# Patient Record
Sex: FEMALE | Race: WHITE | Employment: FULL TIME | URBAN - METROPOLITAN AREA
[De-identification: names, ages, dates, MRNs, and addresses within clinical notes are randomized per-mention and may not be internally consistent; named-entity substitution may affect disease eponyms.]

---

## 2018-06-20 ENCOUNTER — THERAPY VISIT (OUTPATIENT)
Dept: CHIROPRACTIC MEDICINE | Facility: CLINIC | Age: 43
End: 2018-06-20
Payer: COMMERCIAL

## 2018-06-20 DIAGNOSIS — M79.10 MYALGIA: ICD-10-CM

## 2018-06-20 DIAGNOSIS — M25.552 HIP PAIN, LEFT: ICD-10-CM

## 2018-06-20 DIAGNOSIS — M99.05 SOMATIC DYSFUNCTION OF PELVIS REGION: Primary | ICD-10-CM

## 2018-06-20 DIAGNOSIS — G57.02 PIRIFORMIS SYNDROME, LEFT: ICD-10-CM

## 2018-06-20 PROCEDURE — 99202 OFFICE O/P NEW SF 15 MIN: CPT | Performed by: CHIROPRACTOR

## 2018-06-26 PROBLEM — M79.10 MYALGIA: Status: ACTIVE | Noted: 2018-06-26

## 2018-06-26 PROBLEM — M99.05 SOMATIC DYSFUNCTION OF PELVIS REGION: Status: ACTIVE | Noted: 2018-06-26

## 2018-06-26 PROBLEM — M25.552 HIP PAIN, LEFT: Status: ACTIVE | Noted: 2018-06-26

## 2018-06-26 PROBLEM — G57.02 PIRIFORMIS SYNDROME, LEFT: Status: ACTIVE | Noted: 2018-06-26

## 2018-06-27 NOTE — PROGRESS NOTES
[facility]  Jun 20, 2018    Subjective:  Dina Maldonado   42 year old   female    CC: L posterior hip pain  Medications reviewed: Denies   Visit: 1/6  Goal: decrease pain 50%, sit for 30 minutes without hip pain   Location: L posterior hip, L hamstring  DELFINO: Denies any specific DELFINO, gradual pain from exercising over last month   Pain: varies but 7/10 at it worse, much worse with sitting  Previous History: Denies any significant trauma  Progression: not changing   Quality: ache, tightness, tingling sensation   Radiation: L hamstring above the knee  Pain is worse with: sitting, waking up  Pain is better with: movement   Timing: frequent pain  Under care: has not seen anyone else for this  Imaging: Denies  Social: alert, oriented, and active.  and avid exercises. Works out very hard most days of the week. Has not missed a day of working out.       Objective:  Inspection:  No SDD  No Scars  Normal Gait    Palpation:  No specific pain  Palpable soreness over L posterior hip   Myofascitis 2/4 noted over L hip ER's, L hamstring    ROM:  Lumbar flexion   90/90, tightness left posterior hip   Lumbar extension  30/30  Right Hip IR  31/45  Left Hip IR  22/45  Right Hip ER  44/60  Left  hip ER  48/60  SLR symmetric with mild discomfort over left hamstring    MMT:  Left glute med 4/5 with posterior hip discomfort   Right glute med 4/5 with no pain  Left TFL 5/5 with no pain  Right TFL 5/5 with no pain  Left piriformis 4/5 with mild discomfort   Right piriformis 5/5 with no pain    MET:  L short    Squat:  Shift to L   Foot flare R    Lunge:  L knee valgus     NAL:   Restricted SI on right side    Neuro:  Able to toe walk and heel walk  L4-S1 light touch WNL    Ortho:  SLR (-)    C5-T1 light touch WNL  Reflexes C5, C6, C7 2/4 and symmetric  MMT C5, C6, C7, C8, T1 5/5 Bilateral     Assessment:  NAL with associated myofascitis and weakness  Piriformis syndrome, possible disc buldge     Plan:   Decrease  pain 50%    Restore symmetric hip IR   Restore symmetric hip ER   Strengthen hip stabilizers to 5/5 B   Restore pelvic leveling   Restore segmental motion   Functional goals in history    Patient was given detailed history, review of symptoms, examination, functional examination, and report of findings. After this patient was treated with chiropractic adjustments, manual therapy, and therapeutic exercise. Patient tolerated treatment well. Patients treatment plan with be 2 times per week for 2 weeks followed by 1 time per week for 2 weeks. Following treatment plan a follow up exam will be done to make sure patient is improving. Treatment frequency will degrease as patients subjective complaints improve as well as objective findings. Prognosis for care is good based on fact that patient is active and is willing to take active approach in care.     Patient tolerated treatment well today  Treatment Time: 45 minutes  41349 manipulation 1-2 segments: MET, Hip LAD, (no Manual)  03790 Manual therapy: (ART, Graston, Strain Counter Strain, Fascial Manipulation, Cupping) performed over area of L hip ER's, L psoas, B glutes  07389 therapeutic exercise (30 minutes): bottom leg hip ER, sciatic floss  Strapping: hip IR on L  Taping:  Next visit: 1 week  Other:      Mayank Lane DC, MEd, ATC

## 2018-07-03 ENCOUNTER — THERAPY VISIT (OUTPATIENT)
Dept: CHIROPRACTIC MEDICINE | Facility: CLINIC | Age: 43
End: 2018-07-03
Payer: COMMERCIAL

## 2018-07-03 DIAGNOSIS — M25.552 HIP PAIN, LEFT: ICD-10-CM

## 2018-07-03 DIAGNOSIS — M79.10 MYALGIA: ICD-10-CM

## 2018-07-03 DIAGNOSIS — G57.02 PIRIFORMIS SYNDROME, LEFT: ICD-10-CM

## 2018-07-03 DIAGNOSIS — M99.05 SOMATIC DYSFUNCTION OF PELVIS REGION: ICD-10-CM

## 2018-07-03 PROCEDURE — 98940 CHIROPRACT MANJ 1-2 REGIONS: CPT | Mod: AT | Performed by: CHIROPRACTOR

## 2018-07-03 PROCEDURE — 97110 THERAPEUTIC EXERCISES: CPT | Performed by: CHIROPRACTOR

## 2018-07-04 NOTE — PROGRESS NOTES
[facility]  Jun 20, 2018    Subjective:  Dina Maldonado   42 year old   female    CC: L posterior hip pain  Medications reviewed: Denies   Visit: 1/6  Goal: decrease pain 50%, sit for 30 minutes without hip pain   Location: L posterior hip, L hamstring  DELFINO: Denies any specific DELFINO, gradual pain from exercising over last month   Pain: varies but 7/10 at it worse, much worse with sitting  Previous History: Denies any significant trauma  Progression: not changing   Quality: ache, tightness, tingling sensation   Radiation: L hamstring above the knee  Pain is worse with: sitting, waking up  Pain is better with: movement   Timing: frequent pain  Under care: has not seen anyone else for this  Imaging: Denies  Social: alert, oriented, and active.  and avid exercises. Works out very hard most days of the week. Has not missed a day of working out.   Comes in today doing very well. Notes after exam is feeling better. Able to sit more. She is working on active care. Denies any new issues or radiating pain.       Objective:  Inspection:  No SDD  No Scars  Normal Gait    Palpation:  No specific pain  Palpable soreness over L posterior hip   Myofascitis 2/4 noted over L hip ER's, L hamstring    ROM:  Lumbar flexion   90/90, tightness left posterior hip   Lumbar extension  30/30  Right Hip IR  31/45  Left Hip IR  22/45  Right Hip ER  44/60  Left  hip ER  48/60  SLR symmetric with mild discomfort over left hamstring    MMT:  Left glute med 4/5 with posterior hip discomfort   Right glute med 4/5 with no pain  Left TFL 5/5 with no pain  Right TFL 5/5 with no pain  Left piriformis 4/5 with mild discomfort   Right piriformis 5/5 with no pain    MET:  L short    Squat:  Shift to L   Foot flare R    Lunge:  L knee valgus     NAL:   Restricted SI on right side    Ortho:  SLR (-)    Assessment:  NAL with associated myofascitis and weakness  Piriformis syndrome, possible disc buldge     Plan:  Patient tolerated  treatment well today  Treatment Time: 45 minutes  52177 manipulation 1-2 segments: MET, Hip LAD, (no Manual)  56786 Manual therapy: (ART, Graston, Strain Counter Strain, Fascial Manipulation, Cupping) performed over area of L hip ER's, L psoas, B glutes  10665 therapeutic exercise (30 minutes): bottom leg hip ER, sciatic floss  Dry Needle: 4 with IFC over glute med area (tolerated well)  Strapping: hip IR on L  Taping:  Theragun: 4 minutes over left posterior hip   Next visit: 1 week  Other:      Mayank Lane DC, MEd, ATC

## 2018-07-20 ENCOUNTER — THERAPY VISIT (OUTPATIENT)
Dept: CHIROPRACTIC MEDICINE | Facility: CLINIC | Age: 43
End: 2018-07-20
Payer: COMMERCIAL

## 2018-07-20 DIAGNOSIS — G57.02 PIRIFORMIS SYNDROME, LEFT: ICD-10-CM

## 2018-07-20 DIAGNOSIS — M25.552 HIP PAIN, LEFT: ICD-10-CM

## 2018-07-20 DIAGNOSIS — M99.05 SOMATIC DYSFUNCTION OF PELVIS REGION: ICD-10-CM

## 2018-07-20 DIAGNOSIS — M79.10 MYALGIA: ICD-10-CM

## 2018-07-20 PROCEDURE — 97110 THERAPEUTIC EXERCISES: CPT | Performed by: CHIROPRACTOR

## 2018-07-20 PROCEDURE — 98940 CHIROPRACT MANJ 1-2 REGIONS: CPT | Mod: AT | Performed by: CHIROPRACTOR

## 2018-07-20 NOTE — PROGRESS NOTES
[facility]  Jun 20, 2018    Subjective:  Dina Maldonado   42 year old   female    CC: L posterior hip pain  Medications reviewed: Denies   Visit: 2/6  Goal: decrease pain 50%, sit for 30 minutes without hip pain   Location: L posterior hip, L hamstring  DELFINO: Denies any specific DELFINO, gradual pain from exercising over last month   Pain: varies but 7/10 at it worse, much worse with sitting  Previous History: Denies any significant trauma  Progression: not changing   Quality: ache, tightness, tingling sensation   Radiation: L hamstring above the knee  Pain is worse with: sitting, waking up  Pain is better with: movement   Timing: frequent pain  Under care: has not seen anyone else for this  Imaging: Denies  Social: alert, oriented, and active.  and avid exercises. Works out very hard most days of the week. Has not missed a day of working out.   Comes in today after vacation and notes she is sore in B hips with L most sore. Notes that felt good after last session, but sitting for long periods on plane caused it to be more sore. She notes she is getting sore with lifting and we spent time discussing that she needs to work the smaller muscles instead and she agreed. She denies any new issues.   She is pleased with treatment approach.       Objective:  Inspection:  No SDD  No Scars  Normal Gait    Palpation:  No specific pain  Palpable soreness over L posterior hip   Myofascitis 2/4 noted over L hip ER's, L hamstring    ROM:  Lumbar flexion   90/90, tightness left posterior hip   Lumbar extension  30/30  Right Hip IR  31/45  Left Hip IR  22/45  Right Hip ER  44/60  Left  hip ER  48/60  SLR symmetric with mild discomfort over left hamstring    MMT:  Left glute med 4/5 with posterior hip discomfort   Right glute med 4/5 with no pain  Left TFL 5/5 with no pain  Right TFL 5/5 with no pain  Left piriformis 4/5 with mild discomfort   Right piriformis 5/5 with no pain    MET:  L short    Squat:  Shift  to L   Foot flare R    Lunge:  L knee valgus     NAL:   Restricted SI on right side    Ortho:  SLR (-)    Assessment:  NAL with associated myofascitis and weakness  Piriformis syndrome, possible disc buldge     Plan:  Patient tolerated treatment well today  Treatment Time: 45 minutes  90916 manipulation 1-2 segments: MET, Hip LAD, (no Manual)  30446 Manual therapy: (ART, Graston, Strain Counter Strain, Fascial Manipulation, Cupping) performed over area of L hip ER's, L psoas, B glutes  97072 therapeutic exercise (30 minutes): bottom leg hip ER, sciatic floss, clams, side lying leg raises   Dry Needle: 4 posterior hips   Strapping: hip IR on L  Taping:  Theragun: 4 minutes over left posterior hip   Next visit: 1 week  Other:      Mayank Lane DC, MEd, ATC

## 2018-07-27 ENCOUNTER — THERAPY VISIT (OUTPATIENT)
Dept: CHIROPRACTIC MEDICINE | Facility: CLINIC | Age: 43
End: 2018-07-27
Payer: COMMERCIAL

## 2018-07-27 DIAGNOSIS — M99.05 SOMATIC DYSFUNCTION OF PELVIS REGION: ICD-10-CM

## 2018-07-27 DIAGNOSIS — G57.02 PIRIFORMIS SYNDROME, LEFT: ICD-10-CM

## 2018-07-27 DIAGNOSIS — M79.10 MYALGIA: ICD-10-CM

## 2018-07-27 DIAGNOSIS — M25.552 HIP PAIN, LEFT: ICD-10-CM

## 2018-07-27 PROCEDURE — 98940 CHIROPRACT MANJ 1-2 REGIONS: CPT | Mod: AT | Performed by: CHIROPRACTOR

## 2018-07-27 PROCEDURE — 97110 THERAPEUTIC EXERCISES: CPT | Performed by: CHIROPRACTOR

## 2018-07-27 NOTE — PROGRESS NOTES
[facility]  Jun 20, 2018    Subjective:  Dina Maldonado   42 year old   female    CC: L posterior hip pain  Medications reviewed: Denies   Visit: 3/6  Goal: decrease pain 50%, sit for 30 minutes without hip pain   Location: L posterior hip, L hamstring  DELFINO: Denies any specific DELFINO, gradual pain from exercising over last month   Pain: varies but 7/10 at it worse, much worse with sitting  Previous History: Denies any significant trauma  Progression: not changing   Quality: ache, tightness, tingling sensation   Radiation: L hamstring above the knee  Pain is worse with: sitting, waking up  Pain is better with: movement   Timing: frequent pain  Under care: has not seen anyone else for this  Imaging: Denies  Social: alert, oriented, and active.  and avid exercises. Works out very hard most days of the week. Has not missed a day of working out.     Comes in today doing better. Pain more local over B proximal hamstrings. Less pain into hamstring and down. Much less tingling sensation. Has good days and not good days. Today is good day. Working out fully and working on active care. Denies any new changes.           Objective:  Inspection:  No SDD  No Scars  Normal Gait    Palpation:  No specific pain  Palpable soreness over L posterior hip   Myofascitis 2/4 noted over L hip ER's, B hamstring    ROM:  Lumbar flexion   90/90, tightness left posterior hip   Lumbar extension  30/30  Right Hip IR  31/45  Left Hip IR  22/45  Right Hip ER  44/60  Left  hip ER  48/60  SLR symmetric with mild discomfort over left hamstring    MMT:  Left glute med 4/5 with posterior hip discomfort   Right glute med 4/5 with no pain  Left TFL 5/5 with no pain  Right TFL 5/5 with no pain  Left piriformis 4/5 with mild discomfort   Right piriformis 5/5 with no pain  hamstrings 5/5 B with mild proximal hamstring pain  MET:  L short    Squat:  Shift to L   Foot flare R    Lunge:  L knee valgus     NAL:   Restricted SI on  right side    Ortho:  SLR (-)    Assessment:  NAL with associated myofascitis and weakness  Piriformis syndrome, possible disc buldge   B proximal hamstring tendinitis     Plan:  Patient tolerated treatment well today  Treatment Time: 45 minutes  24111 manipulation 1-2 segments: MET, Hip LAD, (no Manual)  21701 Manual therapy: (ART, Graston, Strain Counter Strain, Fascial Manipulation, Cupping) performed over area of L hip ER's, L psoas, B glutes  09601 therapeutic exercise (30 minutes): bottom leg hip ER, sciatic floss, clams, side lying leg raises, curls on stability ball, single leg RDL without weight, hamstring AI stretching  Dry Needle: 5 proximal hamstrings B  Strapping: hip IR on L  Next visit: 1 week  Other:      Mayank Lane DC, MEd, ATC